# Patient Record
Sex: FEMALE | Race: WHITE
[De-identification: names, ages, dates, MRNs, and addresses within clinical notes are randomized per-mention and may not be internally consistent; named-entity substitution may affect disease eponyms.]

---

## 2017-04-01 ENCOUNTER — HOSPITAL ENCOUNTER (OUTPATIENT)
Dept: HOSPITAL 10 - FTE | Age: 22
LOS: 1 days | Discharge: HOME | End: 2017-04-02
Attending: OBSTETRICS & GYNECOLOGY
Payer: COMMERCIAL

## 2017-04-01 VITALS — SYSTOLIC BLOOD PRESSURE: 140 MMHG | DIASTOLIC BLOOD PRESSURE: 79 MMHG | HEART RATE: 104 BPM | RESPIRATION RATE: 12 BRPM

## 2017-04-01 VITALS — DIASTOLIC BLOOD PRESSURE: 96 MMHG | RESPIRATION RATE: 20 BRPM | SYSTOLIC BLOOD PRESSURE: 147 MMHG | HEART RATE: 104 BPM

## 2017-04-01 VITALS — RESPIRATION RATE: 18 BRPM | HEART RATE: 96 BPM | SYSTOLIC BLOOD PRESSURE: 150 MMHG | DIASTOLIC BLOOD PRESSURE: 88 MMHG

## 2017-04-01 VITALS — HEART RATE: 102 BPM | RESPIRATION RATE: 18 BRPM | DIASTOLIC BLOOD PRESSURE: 74 MMHG | SYSTOLIC BLOOD PRESSURE: 155 MMHG

## 2017-04-01 VITALS — HEART RATE: 104 BPM | RESPIRATION RATE: 17 BRPM | SYSTOLIC BLOOD PRESSURE: 147 MMHG | DIASTOLIC BLOOD PRESSURE: 94 MMHG

## 2017-04-01 VITALS — SYSTOLIC BLOOD PRESSURE: 160 MMHG | DIASTOLIC BLOOD PRESSURE: 70 MMHG | HEART RATE: 110 BPM | RESPIRATION RATE: 16 BRPM

## 2017-04-01 VITALS — SYSTOLIC BLOOD PRESSURE: 141 MMHG | DIASTOLIC BLOOD PRESSURE: 88 MMHG | RESPIRATION RATE: 24 BRPM | HEART RATE: 102 BPM

## 2017-04-01 VITALS — DIASTOLIC BLOOD PRESSURE: 77 MMHG | HEART RATE: 118 BPM | SYSTOLIC BLOOD PRESSURE: 125 MMHG | RESPIRATION RATE: 17 BRPM

## 2017-04-01 VITALS — HEART RATE: 102 BPM | DIASTOLIC BLOOD PRESSURE: 86 MMHG | RESPIRATION RATE: 23 BRPM | SYSTOLIC BLOOD PRESSURE: 132 MMHG

## 2017-04-01 VITALS — DIASTOLIC BLOOD PRESSURE: 92 MMHG | RESPIRATION RATE: 20 BRPM | SYSTOLIC BLOOD PRESSURE: 130 MMHG | HEART RATE: 106 BPM

## 2017-04-01 VITALS — SYSTOLIC BLOOD PRESSURE: 140 MMHG | HEART RATE: 106 BPM | RESPIRATION RATE: 22 BRPM | DIASTOLIC BLOOD PRESSURE: 94 MMHG

## 2017-04-01 VITALS — SYSTOLIC BLOOD PRESSURE: 125 MMHG | DIASTOLIC BLOOD PRESSURE: 73 MMHG | HEART RATE: 119 BPM | RESPIRATION RATE: 17 BRPM

## 2017-04-01 VITALS — DIASTOLIC BLOOD PRESSURE: 89 MMHG | RESPIRATION RATE: 26 BRPM | SYSTOLIC BLOOD PRESSURE: 140 MMHG | HEART RATE: 102 BPM

## 2017-04-01 VITALS
HEIGHT: 60 IN | WEIGHT: 264.55 LBS | BODY MASS INDEX: 51.94 KG/M2 | WEIGHT: 264.55 LBS | HEIGHT: 60 IN | BODY MASS INDEX: 51.94 KG/M2

## 2017-04-01 VITALS — SYSTOLIC BLOOD PRESSURE: 166 MMHG | RESPIRATION RATE: 23 BRPM | DIASTOLIC BLOOD PRESSURE: 92 MMHG | HEART RATE: 114 BPM

## 2017-04-01 VITALS — RESPIRATION RATE: 17 BRPM | SYSTOLIC BLOOD PRESSURE: 126 MMHG | DIASTOLIC BLOOD PRESSURE: 73 MMHG | HEART RATE: 120 BPM

## 2017-04-01 VITALS — DIASTOLIC BLOOD PRESSURE: 75 MMHG | SYSTOLIC BLOOD PRESSURE: 122 MMHG | RESPIRATION RATE: 20 BRPM

## 2017-04-01 VITALS — SYSTOLIC BLOOD PRESSURE: 159 MMHG | DIASTOLIC BLOOD PRESSURE: 96 MMHG | HEART RATE: 116 BPM | RESPIRATION RATE: 18 BRPM

## 2017-04-01 VITALS — HEART RATE: 120 BPM | RESPIRATION RATE: 17 BRPM | SYSTOLIC BLOOD PRESSURE: 126 MMHG | DIASTOLIC BLOOD PRESSURE: 70 MMHG

## 2017-04-01 VITALS — HEART RATE: 104 BPM | RESPIRATION RATE: 19 BRPM | DIASTOLIC BLOOD PRESSURE: 75 MMHG | SYSTOLIC BLOOD PRESSURE: 153 MMHG

## 2017-04-01 VITALS — SYSTOLIC BLOOD PRESSURE: 125 MMHG | DIASTOLIC BLOOD PRESSURE: 72 MMHG | RESPIRATION RATE: 17 BRPM | HEART RATE: 119 BPM

## 2017-04-01 VITALS — DIASTOLIC BLOOD PRESSURE: 88 MMHG | RESPIRATION RATE: 19 BRPM | SYSTOLIC BLOOD PRESSURE: 159 MMHG | HEART RATE: 112 BPM

## 2017-04-01 VITALS — RESPIRATION RATE: 17 BRPM | SYSTOLIC BLOOD PRESSURE: 126 MMHG | DIASTOLIC BLOOD PRESSURE: 76 MMHG | HEART RATE: 118 BPM

## 2017-04-01 VITALS — RESPIRATION RATE: 17 BRPM | DIASTOLIC BLOOD PRESSURE: 86 MMHG | HEART RATE: 115 BPM | SYSTOLIC BLOOD PRESSURE: 156 MMHG

## 2017-04-01 VITALS — TEMPERATURE: 98.5 F

## 2017-04-01 VITALS — RESPIRATION RATE: 20 BRPM | SYSTOLIC BLOOD PRESSURE: 125 MMHG | DIASTOLIC BLOOD PRESSURE: 73 MMHG

## 2017-04-01 DIAGNOSIS — E66.01: ICD-10-CM

## 2017-04-01 DIAGNOSIS — D27.0: Primary | ICD-10-CM

## 2017-04-01 LAB
ADD SCAN DIFF: NO
ADD SCAN DIFF: NO
ADD UMIC: YES
ALBUMIN SERPL-MCNC: 3.9 G/DL (ref 3.3–4.9)
ALBUMIN SERPL-MCNC: 4.7 G/DL (ref 3.3–4.9)
ALBUMIN/GLOB SERPL: 0.97 {RATIO}
ALBUMIN/GLOB SERPL: 1.02 {RATIO}
ALP SERPL-CCNC: 55 IU/L (ref 42–121)
ALP SERPL-CCNC: 71 IU/L (ref 42–121)
ALT SERPL-CCNC: 49 IU/L (ref 13–69)
ALT SERPL-CCNC: 51 IU/L (ref 13–69)
AMYLASE SERPL-CCNC: 55 U/L (ref 11–123)
ANION GAP SERPL CALC-SCNC: 16 MMOL/L (ref 8–16)
ANION GAP SERPL CALC-SCNC: 19 MMOL/L (ref 8–16)
APTT BLD: 32 SEC (ref 25–35)
AST SERPL-CCNC: 28 IU/L (ref 15–46)
AST SERPL-CCNC: 29 IU/L (ref 15–46)
BACTERIA #/AREA URNS HPF: (no result) /[HPF]
BASOPHILS # BLD AUTO: 0 10^3/UL (ref 0–0.1)
BASOPHILS # BLD AUTO: 0 10^3/UL (ref 0–0.1)
BASOPHILS NFR BLD: 0.1 % (ref 0–2)
BASOPHILS NFR BLD: 0.2 % (ref 0–2)
BILIRUB DIRECT SERPL-MCNC: 0 MG/DL (ref 0–0.2)
BILIRUB DIRECT SERPL-MCNC: 0 MG/DL (ref 0–0.2)
BILIRUB SERPL-MCNC: 0.3 MG/DL (ref 0.2–1.3)
BILIRUB SERPL-MCNC: 0.6 MG/DL (ref 0.2–1.3)
BUN SERPL-MCNC: 13 MG/DL (ref 7–20)
BUN SERPL-MCNC: 8 MG/DL (ref 7–20)
CALCIUM SERPL-MCNC: 8.5 MG/DL (ref 8.4–10.2)
CALCIUM SERPL-MCNC: 9.2 MG/DL (ref 8.4–10.2)
CHLORIDE SERPL-SCNC: 101 MMOL/L (ref 97–110)
CHLORIDE SERPL-SCNC: 102 MMOL/L (ref 97–110)
CO2 SERPL-SCNC: 25 MMOL/L (ref 21–31)
CO2 SERPL-SCNC: 25 MMOL/L (ref 21–31)
COLOR UR: (no result)
CREAT SERPL-MCNC: 0.53 MG/DL (ref 0.44–1)
CREAT SERPL-MCNC: 0.64 MG/DL (ref 0.44–1)
EOSINOPHIL # BLD: 0 10^3/UL (ref 0–0.5)
EOSINOPHIL # BLD: 0 10^3/UL (ref 0–0.5)
EOSINOPHIL NFR BLD: 0 % (ref 0–7)
EOSINOPHIL NFR BLD: 0.1 % (ref 0–7)
ERYTHROCYTE [DISTWIDTH] IN BLOOD BY AUTOMATED COUNT: 12.3 % (ref 11.5–14.5)
ERYTHROCYTE [DISTWIDTH] IN BLOOD BY AUTOMATED COUNT: 12.5 % (ref 11.5–14.5)
GLOBULIN SER-MCNC: 4 G/DL (ref 1.3–3.2)
GLOBULIN SER-MCNC: 4.6 G/DL (ref 1.3–3.2)
GLUCOSE SERPL-MCNC: 138 MG/DL (ref 70–220)
GLUCOSE SERPL-MCNC: 139 MG/DL (ref 70–220)
GLUCOSE UR STRIP-MCNC: NEGATIVE %
HCT VFR BLD CALC: 36.2 % (ref 37–47)
HCT VFR BLD CALC: 42.3 % (ref 37–47)
HGB BLD-MCNC: 12.3 G/DL (ref 12–16)
HGB BLD-MCNC: 14.1 G/DL (ref 12–16)
INR PPP: 0.95
KETONES UR STRIP.AUTO-MCNC: NEGATIVE MG/DL
LYMPHOCYTES # BLD AUTO: 1 10^3/UL (ref 0.8–2.9)
LYMPHOCYTES # BLD AUTO: 2.4 10^3/UL (ref 0.8–2.9)
LYMPHOCYTES NFR BLD AUTO: 16 % (ref 18–55)
LYMPHOCYTES NFR BLD AUTO: 6.2 % (ref 18–55)
MCH RBC QN AUTO: 29.1 PG (ref 29–33)
MCH RBC QN AUTO: 29.6 PG (ref 29–33)
MCHC RBC AUTO-ENTMCNC: 33.3 G/DL (ref 32–37)
MCHC RBC AUTO-ENTMCNC: 34 G/DL (ref 32–37)
MCV RBC AUTO: 87.2 FL (ref 72–104)
MCV RBC AUTO: 87.4 FL (ref 72–104)
MONOCYTES # BLD: 0.7 10^3/UL (ref 0.3–0.9)
MONOCYTES # BLD: 0.7 10^3/UL (ref 0.3–0.9)
MONOCYTES NFR BLD: 3.9 % (ref 0–13)
MONOCYTES NFR BLD: 4.8 % (ref 0–13)
NEUTROPHILS # BLD: 11.9 10^3/UL (ref 1.6–7.5)
NEUTROPHILS # BLD: 15.1 10^3/UL (ref 1.6–7.5)
NEUTROPHILS NFR BLD AUTO: 78.4 % (ref 30–74)
NEUTROPHILS NFR BLD AUTO: 89.4 % (ref 30–74)
NITRITE UR QL STRIP.AUTO: NEGATIVE
NRBC # BLD MANUAL: 0 10^3/UL (ref 0–0)
NRBC # BLD MANUAL: 0 10^3/UL (ref 0–0)
NRBC BLD QL: 0 /100WBC (ref 0–0)
NRBC BLD QL: 0 /100WBC (ref 0–0)
PLATELET # BLD: 291 10^3/UL (ref 140–415)
PLATELET # BLD: 396 10^3/UL (ref 140–415)
PMV BLD AUTO: 8.9 FL (ref 7.4–10.4)
PMV BLD AUTO: 9.1 FL (ref 7.4–10.4)
POTASSIUM SERPL-SCNC: 3.7 MMOL/L (ref 3.5–5.1)
POTASSIUM SERPL-SCNC: 3.7 MMOL/L (ref 3.5–5.1)
PROT SERPL-MCNC: 7.9 G/DL (ref 6.1–8.1)
PROT SERPL-MCNC: 9.3 G/DL (ref 6.1–8.1)
PROTHROMBIN TIME: 12.7 SEC (ref 12.2–14.2)
PT RATIO: 1
RBC # BLD AUTO: 4.15 10^6/UL (ref 4.2–5.4)
RBC # BLD AUTO: 4.84 10^6/UL (ref 4.2–5.4)
RBC # UR AUTO: (no result) /UL
RBC #/AREA URNS HPF: (no result) /HPF
SODIUM SERPL-SCNC: 138 MMOL/L (ref 135–144)
SODIUM SERPL-SCNC: 142 MMOL/L (ref 135–144)
URINE BILIRUBIN (DIP): NEGATIVE
URINE TOTAL PROTEIN (DIP): NEGATIVE
UROBILINOGEN UR STRIP-ACNC: (no result) (ref 0.1–1)
WBC # BLD AUTO: 15.2 10^3/UL (ref 4.8–10.8)
WBC # BLD AUTO: 16.9 10^3/UL (ref 4.8–10.8)
WBC # UR STRIP: (no result) /UL

## 2017-04-01 PROCEDURE — 81003 URINALYSIS AUTO W/O SCOPE: CPT

## 2017-04-01 PROCEDURE — 86901 BLOOD TYPING SEROLOGIC RH(D): CPT

## 2017-04-01 PROCEDURE — 81001 URINALYSIS AUTO W/SCOPE: CPT

## 2017-04-01 PROCEDURE — 74177 CT ABD & PELVIS W/CONTRAST: CPT

## 2017-04-01 PROCEDURE — 88104 CYTOPATH FL NONGYN SMEARS: CPT

## 2017-04-01 PROCEDURE — 86900 BLOOD TYPING SEROLOGIC ABO: CPT

## 2017-04-01 PROCEDURE — 85730 THROMBOPLASTIN TIME PARTIAL: CPT

## 2017-04-01 PROCEDURE — 80306 DRUG TEST PRSMV INSTRMNT: CPT

## 2017-04-01 PROCEDURE — 96376 TX/PRO/DX INJ SAME DRUG ADON: CPT

## 2017-04-01 PROCEDURE — 76856 US EXAM PELVIC COMPLETE: CPT

## 2017-04-01 PROCEDURE — 49322 LAPAROSCOPY ASPIRATION: CPT

## 2017-04-01 PROCEDURE — 85025 COMPLETE CBC W/AUTO DIFF WBC: CPT

## 2017-04-01 PROCEDURE — 96374 THER/PROPH/DIAG INJ IV PUSH: CPT

## 2017-04-01 PROCEDURE — 96375 TX/PRO/DX INJ NEW DRUG ADDON: CPT

## 2017-04-01 PROCEDURE — 85610 PROTHROMBIN TIME: CPT

## 2017-04-01 PROCEDURE — 82150 ASSAY OF AMYLASE: CPT

## 2017-04-01 PROCEDURE — 83690 ASSAY OF LIPASE: CPT

## 2017-04-01 PROCEDURE — 86850 RBC ANTIBODY SCREEN: CPT

## 2017-04-01 PROCEDURE — 87086 URINE CULTURE/COLONY COUNT: CPT

## 2017-04-01 PROCEDURE — 86920 COMPATIBILITY TEST SPIN: CPT

## 2017-04-01 PROCEDURE — 80053 COMPREHEN METABOLIC PANEL: CPT

## 2017-04-01 PROCEDURE — 84703 CHORIONIC GONADOTROPIN ASSAY: CPT

## 2017-04-01 PROCEDURE — 88305 TISSUE EXAM BY PATHOLOGIST: CPT

## 2017-04-01 RX ADMIN — CEFAZOLIN SCH MLS/HR: 1 INJECTION, POWDER, FOR SOLUTION INTRAMUSCULAR; INTRAVENOUS at 22:00

## 2017-04-01 RX ADMIN — CEFAZOLIN SCH MLS/HR: 1 INJECTION, POWDER, FOR SOLUTION INTRAMUSCULAR; INTRAVENOUS at 20:45

## 2017-04-01 NOTE — HPN
Date/Time of Note


Date/Time of Note


DATE: 4/1/17 


TIME: 10:42





Interval H&P Admission Note





 


 Laboratory Tests








Test


  4/1/17


07:15


 


Blood Urea Nitrogen 13mg/dl 


 


Carbon Dioxide Level 25mmol/L 


 


Chloride Level 102mmol/L 


 


Creatinine 0.64mg/dl 


 


Glucose Level 139mg/dl 


 


Hematocrit 42.3% 


 


Hemoglobin 14.1g/dl 


 


Platelet Count 91496^3/UL 


 


Potassium Level 3.7mmol/L 


 


Sodium Level 142mmol/L 


 


White Blood Count 15.210^3/ul 





Patient is a 21-year-old virginal who presents with acute onset of lower pelvic 

pain with a pain level of 9 out of 10 which started early this morning


Patient reports some nausea vomiting, no diarrhea, no fever


CT scan and pelvic ultrasound with Dopplers suggesting ovarian torsion


Patient is hemodynamically stable





PE


VSS


Abd soft, positive pain in the right lower quadrant


positive rebound tenderness on the right lower quadrant





A/P


Ovarian Torsion unclear if on the right or left side


Plan to proceed with Laparoscopic un-torsion of the ovary, possible unilateral/

bilateral salpingo-oophorectomy, possible laparotomy


Patient was counseled regarding benefits and all risks including but not 

limited to infection, bleeding, possible trauma to other organs including 

bladder or bowel


Patient understand and agrees to proceed with planned surgery











MOY JETT Apr 1, 2017 11:01

## 2017-04-01 NOTE — RADRPT
PROCEDURE:   US Pelvis. 

 

CLINICAL INDICATION:   21-year-old female with pelvic pain. 

 

TECHNIQUE:   Multiple sonographic images of the pelvis were obtained utilizing a transabdominal and 
endovaginal technique.   The images were reviewed on a PACS workstation. 

 

COMPARISON:   None. 

 

FINDINGS:

The uterus is visualized and measures 9.4 cm sagittal by 2.9 cm AP by 3.4 cm transverse transabdomin
al imaging. The endometrial echo complex is normal and measures 7.9 mm. There is no evidence for dilma
e fluid. The right ovary has a normal echotexture and measures 7.5 x 4.5 by 6.2 cm on endovaginal im
aging .  The left ovary has a normal echotexture and measures 3 x 2 by 2.5 cm transabdominal imaging
. 13.7 by 8.8 x 13.2 cm left adnexal cyst is identified.

 

IMPRESSION:

 

1.  13 .7 x 13.1 x 8.7 cm cystic left adnexal mass.

2.  Enlarged heterogeneous right ovary without normal blood flow on Doppler imaging.  Findings consi
stent with right ovarian torsion.

3.  Normal left ovary.

4.  Findings were phoned immediately to Dr. Ashley Egan.

 

RPTAT:AAJJ

_____________________________________________ 

Physician Marina           Date    Time 

Electronically viewed and signed by Physician Marina on 04/01/2017 10:03 

 

D:  04/01/2017 10:03  T:  04/01/2017 10:03

JOSEFINA/

## 2017-04-01 NOTE — ERD
ER Documentation


Chief Complaint


Date/Time


DATE: 17 


TIME: 07:24


Chief Complaint


RLQ PAIN SINCE YESTERSDAY





HPI


This is a 21-year-old female that presents to emergency department complaining 

of a sudden onset of right flank pain that began to radiate to the right lower 

quadrant.  The patient stated the pain awoke her from her sleep roughly 1 hour 

prior to arrival.  She has had multiple episodes of nonbloody nonbilious emesis 

since the onset of the pain.  Patient did indicate she had been drinking 

alcohol with her friends yesterday evening but denied any illicit drug use.  

She indicates she has had similar episodes of this pain over the past year.  

She denies any abnormal urethral discharge and indicates she is not sexually 

active.  She denies any menorrhagia or metrorrhagia.  She had no fevers or 

shaking or chills.  She does indicate that she has been experiencing painful 

urination over the past several days with no frequency urgency.  She denies any 

recent remote blunt abdominal trauma.  She has had no recent travel or 

hospitalizations.  She did not take any analgesic medication for the pain.





ROS


All systems reviewed and are negative except as per history of present illness.





Medications


Home Meds


Active Scripts


Hydrocodone Bit-Acetaminophen* (Norco*) 5-325 Mg Tab, 1 TAB PO Q6 Y for PAIN, #

20 TAB


   Prov:AMANDA CHAVEZ NP         16


Reported Medications


[None] Unknown Strength  No Conflict Check


   16


[Denies Meds]   No Conflict Check


   9/29/10





Allergies


Allergies:  


Coded Allergies:  


     No Known Drug Allergy (Verified  Allergy, Mild, 9/29/10)





PMhx/Soc


History of Surgery:  No


Anesthesia Reaction:  No


Hx Neurological Disorder:  No


Hx Respiratory Disorders:  No


Hx Cardiac Disorders:  No


Hx Psychiatric Problems:  No


Hx Miscellaneous Medical Probl:  No


Hx Alcohol Use:  No


Hx Substance Use:  No


Hx Tobacco Use:  No





Physical Exam


Vitals





Vital Signs








  Date Time  Temp Pulse Resp B/P Pulse Ox O2 Delivery O2 Flow Rate FiO2


 


17 09:53 98.5 108 18 135/79 96 Room Air  


 


17 07:07 98.0 121 18 168/96 99   








Physical Exam


Constitutional:Well-developed. Well-nourished.  Patient actively vomiting and 

appeared to be in a significant amount of discomfort secondary to pain.


HEENT:Normocephalic. Atraumatic.Pupils were equal round reactive to light. Dry 

mucous membranes.No tonsillar exudates.


Neck: No nuchal rigidity. No lymphadenopathy. No posterior cervical spine 

tenderness or step-offs.


Respiratory: Not using accessory muscles of respiration.Lungs were clear to 

auscultation bilaterally. No rhonchi. No rales. No wheezing. 


Cardiovascular: Regular rate regular rhythm.No murmurs. No rubs were 

appreciated.S1, S2 normal. Distal pulses are palpable 2+ bilaterally.


GI: Abdomen was obese so exam is limited due to body habitus.  Right CVA 

tenderness and tenderness in the right lower quadrant not specifically over 

McBurney's point.  Psoas sign negative.  Obturator sign negative.  Non 

Distended. No pulsatile abdominal masses or bruits. No rebound. No guarding. 

Bowel sounds were present and normal. 


: Pelvic exam not performed the patient states she is not currently sexually 

active and has never engaged in sexual intercourse


Muscle skeletal: Full range of motion of both the upper and lower extremities 

bilaterally.Normal muscle tone.No assymetrical calf tenderness or swelling. 


Skin: No petechia, no purpura. No lesions on the palms or the soles of the 

feet. No maculopapular rash.


NEURO: Patient was alert, awake, orientated x3.No facial droop. Gait observed 

and normal with no ataxia.Speech had regular rate and rhythm. No focal 

neurological deficits.


Result Diagram:  


17 0715                                                                    

            17 0715





Results 24 hrs





 Laboratory Tests








Test


  17


07:15


 


White Blood Count 15.210^3/ul 


 


Red Blood Count 4.8410^6/ul 


 


Hemoglobin 14.1g/dl 


 


Hematocrit 42.3% 


 


Mean Corpuscular Volume 87.4fl 


 


Mean Corpuscular Hemoglobin 29.1pg 


 


Mean Corpuscular Hemoglobin


Concent 33.3g/dl 


 


 


Red Cell Distribution Width 12.3% 


 


Platelet Count 31594^3/UL 


 


Mean Platelet Volume 9.1fl 


 


Neutrophils % 78.4% 


 


Lymphocytes % 16.0% 


 


Monocytes % 4.8% 


 


Eosinophils % 0.1% 


 


Basophils % 0.2% 


 


Nucleated Red Blood Cells % 0.0/100WBC 


 


Neutrophils # 11.910^3/ul 


 


Lymphocytes # 2.410^3/ul 


 


Monocytes # 0.710^3/ul 


 


Eosinophils # 0.010^3/ul 


 


Basophils # 0.010^3/ul 


 


Nucleated Red Blood Cells # 0.010^3/ul 


 


Prothrombin Time 12.7Sec 


 


Prothrombin Time Ratio 1.0 


 


INR International Normalized


Ratio 0.95 


 


 


Activated Partial


Thromboplast Time 32.0Sec 


 


 


Urine Color LT. YELLOW 


 


Urine Clarity CLEAR 


 


Urine pH 5.5 


 


Urine Specific Gravity 1.020 


 


Urine Ketones NEGATIVE 


 


Urine Nitrite NEGATIVE 


 


Urine Bilirubin NEGATIVE 


 


Urine Urobilinogen 0.2  E.U./dL 


 


Urine Leukocyte Esterase 1+ 


 


Urine Microscopic RBC 0-2/HPF 


 


Urine Microscopic WBC 2-5/HPF 


 


Urine Epithelial Cells FEW 


 


Urine Bacteria FEW 


 


Urine Hemoglobin TRACE 


 


Urine Glucose NEGATIVE% 


 


Urine Total Protein NEGATIVE 


 


Sodium Level 142mmol/L 


 


Potassium Level 3.7mmol/L 


 


Chloride Level 102mmol/L 


 


Carbon Dioxide Level 25mmol/L 


 


Anion Gap 19 


 


Blood Urea Nitrogen 13mg/dl 


 


Creatinine 0.64mg/dl 


 


Glucose Level 139mg/dl 


 


Calcium Level 9.2mg/dl 


 


Total Bilirubin 0.3mg/dl 


 


Direct Bilirubin 0.00mg/dl 


 


Indirect Bilirubin 0.3mg/dl 


 


Aspartate Amino Transf


(AST/SGOT) 29IU/L 


 


 


Alanine Aminotransferase


(ALT/SGPT) 51IU/L 


 


 


Alkaline Phosphatase 71IU/L 


 


Total Protein 9.3g/dl 


 


Albumin 4.7g/dl 


 


Globulin 4.60g/dl 


 


Albumin/Globulin Ratio 1.02 


 


Amylase Level 55U/L 


 


Lipase 36U/L 


 


Ethyl Alcohol Level < 10.0mg/dl 








 Current Medications








 Medications


  (Trade)  Dose


 Ordered  Sig/Kailee


 Route


 PRN Reason  Start Time


 Stop Time Status Last Admin


Dose Admin


 


 Sodium Chloride


  (NS)  1,000 ml @ 


 1,000 mls/hr  Q1H STAT


 IV


   17 07:13


 17 08:12 DC 17 07:25


 


 


 Morphine Sulfate


  (morphine)  2 mg  ONCE  STAT


 IV


   17 07:13


 17 07:14 Cancel  


 


 


 Ondansetron HCl


  (Zofran Inj)  4 mg  ONCE  STAT


 IV


   17 07:13


 17 07:15 DC 17 07:25


 


 


 Morphine Sulfate


  (morphine)  4 mg  ONCE  STAT


 IV


   17 07:20


 17 07:23 DC 17 07:28


 


 


 Metoclopramide HCl


  (Reglan)  10 mg  ONCE  STAT


 IV


   17 07:20


 17 07:23 DC 17 07:28


 


 


 Famotidine


  (Pepcid Iv)  20 mg  ONCE  STAT


 IV


   17 07:20


 17 07:23 DC 17 07:28


 


 


 Hydromorphone HCl


  (Dilaudid)  1 mg  ONCE  STAT


 IV


   17 07:53


 17 07:54 DC 17 07:57


 


 


 IV Flush 10 ml  10 ml  STK-MED ONCE


 .ROUTE


   17 08:10


 17 08:11 DC 17 08:49


 


 


 Sodium Chloride


  (NS)  100 ml @ ud  STK-MED ONCE


 .ROUTE


   17 08:10


 17 08:11 DC 17 08:49


 


 


 Iohexol 150 ml  150 ml  STK-MED ONCE


 .ROUTE


   17 08:10


 17 08:11 DC 17 08:49


 


 


 Piperacillin Sod/


 Tazobactam Sod


  (Zosyn 3.375gm/


 100 ml (Pmx))  100 ml @ 


 200 mls/hr  ONCE  ONCE


 IVPB


   17 09:30


 17 09:59 DC 17 09:48


 


 


 Ondansetron HCl


  (Zofran Inj)  4 mg  BRIDGE ORDER PRN


 IV


 NAUSEA AND/OR VOMITING  17 10:00


 17 09:59   


 


 


 Hydromorphone HCl


  (Dilaudid)  1 mg  ONCE  STAT


 IV


   17 10:03


 17 10:09 DC 17 10:11


 


 


 Ondansetron HCl


  (Zofran Inj)  4 mg  ONCE  STAT


 IV


   17 10:03


 17 10:09 DC 17 10:11


 











Procedures/MDM


This patient presented to the emergency department with abdominal pain and was 

seen and evaluated by myself. My differential diagnosis included but was not 

limited to abdominal aortic aneurysm, appendicitis, pancreatitis, perforated 

peptic ulcer, perforated viscus, Boerhaaves syndrome or visceral pain such as 

diverticulitis, DKA, esophagitis, hepatitis or bowel obstruction.





The patient was placed on a cardiac monitor, continuous pulse oximetry, and IV 

access was established by nursing staff.  The patient was actively vomiting and 

therefore was immediately given IV Zofran and Reglan and Pepcid.  For analgesic 

control the patient received intravenous morphine no improvement of her pain.  

Therefore she received intravenous Dilaudid





I obtained a CT scan of the abdomen given the severity of the patient's pain.  

CT scan of the abdomen or and reviewed by myself the radiologist indicated the 

followin.  There is a large left adnexal cyst measuring 7.9 x 12.4 x 13.1 cm which 

lies adjacent to or may arise from an elliptical solid mass potentially 

representing the right ovary measuring 8.6 x 4.6 x 4.7 cm. A pelvic sonogram is 

recommended for further characterization. An ovarian torsion should be 

excluded. No free fluid is present in the pelvis.


2.  Hepatomegaly with fatty infiltration of the liver.


3.  Normal vermiform appendix.  No evidence of diverticulosis or diverticulitis.


4.  Obesity.





I immediately obtained an OBGYN consult and spoke with Dr. Sen. Given the 

presentation we prepared for the patient to go to the OR and during this 

process also obtained an ultrasound with color flow study by the abdomen as the 

patient is not currently sexually active therefore transvaginal ultrasound was 

not obtained.  This was consistent with ovarian torsion.  





I had already spoken with the OB/GYN on-call, Dr. Sen, and informed her of 

the severity of the patient's condition prior to the ultrasound being obtained.

  Dr. Sen will be taking the patient to the OR is currently informed that 

the patient will be going for immediate surgical intervention.  I spoke with 

the ultrasound tech who did indicate there was no color flow to the right ovary 

which was suggestive of ovarian torsion.   The patient had already been given 

IV Zosyn.  She received further analgesic control of Dilaudid.  Her pain at the 

time of going to the OR at 10:02 AM was 6 out of 10 in intensity.  The nursing 

supervisor was also informed and kindly helped to immediately set up for the 

patient to go to the OR stat due to the severity of her condition.





Critical Care:


Time: 60 minutes


Treatments/Evaluations: Close monitoring and treatment of unstable vital signs, 

cardiorespiratory, and neurologic status, while maintaining tight balance of 

fluid, respiratory, and cardiac interventions.  Time does not include 

performing any of the above billable procedures.





Departure


Diagnosis:  


 Primary Impression:  


 Ovarian torsion


Condition:  Serious











DANITA HUTTON 2017 07:26

## 2017-04-02 VITALS — DIASTOLIC BLOOD PRESSURE: 72 MMHG | SYSTOLIC BLOOD PRESSURE: 119 MMHG | RESPIRATION RATE: 18 BRPM

## 2017-04-02 VITALS — RESPIRATION RATE: 18 BRPM | DIASTOLIC BLOOD PRESSURE: 62 MMHG | SYSTOLIC BLOOD PRESSURE: 115 MMHG

## 2017-04-02 LAB
ADD SCAN DIFF: NO
BASOPHILS # BLD AUTO: 0 10^3/UL (ref 0–0.1)
BASOPHILS NFR BLD: 0.1 % (ref 0–2)
EOSINOPHIL # BLD: 0 10^3/UL (ref 0–0.5)
EOSINOPHIL NFR BLD: 0 % (ref 0–7)
ERYTHROCYTE [DISTWIDTH] IN BLOOD BY AUTOMATED COUNT: 12.8 % (ref 11.5–14.5)
HCT VFR BLD CALC: 35.4 % (ref 37–47)
HGB BLD-MCNC: 11.6 G/DL (ref 12–16)
LYMPHOCYTES # BLD AUTO: 1.9 10^3/UL (ref 0.8–2.9)
LYMPHOCYTES NFR BLD AUTO: 17.8 % (ref 18–55)
MCH RBC QN AUTO: 29.3 PG (ref 29–33)
MCHC RBC AUTO-ENTMCNC: 32.8 G/DL (ref 32–37)
MCV RBC AUTO: 89.4 FL (ref 72–104)
MONOCYTES # BLD: 0.8 10^3/UL (ref 0.3–0.9)
MONOCYTES NFR BLD: 7 % (ref 0–13)
NEUTROPHILS # BLD: 8 10^3/UL (ref 1.6–7.5)
NEUTROPHILS NFR BLD AUTO: 74.8 % (ref 30–74)
NRBC # BLD MANUAL: 0 10^3/UL (ref 0–0)
NRBC BLD QL: 0 /100WBC (ref 0–0)
PLATELET # BLD: 296 10^3/UL (ref 140–415)
PMV BLD AUTO: 9.3 FL (ref 7.4–10.4)
RBC # BLD AUTO: 3.96 10^6/UL (ref 4.2–5.4)
WBC # BLD AUTO: 10.7 10^3/UL (ref 4.8–10.8)

## 2017-04-02 RX ADMIN — CEFAZOLIN SCH MLS/HR: 1 INJECTION, POWDER, FOR SOLUTION INTRAMUSCULAR; INTRAVENOUS at 05:07

## 2017-04-02 NOTE — DS
Date/Time of Note


Date/Time of Note


DATE: 4/2/17 


TIME: 10:21





Discharge Summary


Admission/Discharge Info


Admit Date/Time


4/1/17


Discharge Date/Time


4/2/17


Final Diagnosis


Ovarian torsion s/p L/S R ovarian cystectomy and de-torsion


Patient Condition:  Good


Procedures


Diagnostic Laparoscopy with R ovarian cystectomy and de-torsion


Hospital Course


20yo G0 admitted and taken to the Operating Room for lower abdominal pain and 

imaging suggestive of large ovarian cysts and torsion. In OR, R ovarian torsion 

noted along with large R ovarian cyst. Cystectomy was performed. Hemorrhagic 

cyst noted on R ovary as well. Pt admitted due to nausea and vomiting on POD#0, 

which resolved by POD#1. At the time of discharge, the patient was tolerating a 

regular diet without nausea and vomiting, ambulating and her pain was well 

controlled. Exam notable for well-appearing young woman in no distress, 

cardiovascular and pulmonary exams were unremarkable. Positive bowel sounds 

noted on abdominal exam. Surgical dressings removed and incisions c/d/i with 

Dermabond. Post-op Hgb 11.6 following 25ml EBL at time of surgery.


Home Meds


Active Scripts


Docusate Sodium* (Colace*) 100 Mg Capsule, 100 MG PO BID for CONSTIPATION for 

30 Days, #60 CAP 2 Refills


   Prov:JUAN CARLOS EPPERSON MD         4/2/17


Ibuprofen* (Ibuprofen*) 600 Mg Tablet, 600 MG PO Q6H Y for PAIN for 10 Days, #

40 TAB 1 Refill


   Do not take medication on an empty stomach. Take with food or milk.


   Prov:JUAN CARLOS EPPERSON MD         4/2/17


Discontinued Reported Medications


[None] Unknown Strength  No Conflict Check


   4/25/16


[Denies Meds]   No Conflict Check


   9/29/10


Discontinued Scripts


Hydrocodone Bit-Acetaminophen* (Norco*) 5-325 Mg Tab, 1 TAB PO Q6 Y for PAIN, #

20 TAB


   Prov:AMANDA CHAVEZ NP         4/25/16


Follow-up Plan


F/up with OB/GYN within 2 weeks. Pt advised to avoid heavy lifting for 4 weeks, 

in addition to other precautions on discharge instruction sheet.


Pending Labs





Laboratory Tests








Test


  4/1/17


11:00 4/1/17


19:10 4/2/17


04:28


 


Serum HCG, Qualitative


  NEGATIVE


(NEGATIVE) 


  


 


 


White Blood Count


  


  16.910^3/ul


(4.8-10.8) 10.710^3/ul


(4.8-10.8)


 


Red Blood Count


  


  4.1510^6/ul


(4.20-5.40) 3.9610^6/ul


(4.20-5.40)


 


Hemoglobin


  


  12.3g/dl


(12.0-16.0) 11.6g/dl


(12.0-16.0)


 


Hematocrit


  


  36.2%


(37.0-47.0) 35.4%


(37.0-47.0)


 


Mean Corpuscular Volume


  


  87.2fl


(72.0-104.0) 89.4fl


(72.0-104.0)


 


Mean Corpuscular Hemoglobin


  


  29.6pg


(29.0-33.0) 29.3pg


(29.0-33.0)


 


Mean Corpuscular Hemoglobin


Concent 


  34.0g/dl


(32.0-37.0) 32.8g/dl


(32.0-37.0)


 


Red Cell Distribution Width


  


  12.5%


(11.5-14.5) 12.8%


(11.5-14.5)


 


Platelet Count


  


  01428^3/UL


(140-415) 95075^3/UL


(140-415)


 


Mean Platelet Volume


  


  8.9fl


(7.4-10.4) 9.3fl


(7.4-10.4)


 


Neutrophils %


  


  89.4%


(30.0-74.0) 74.8%


(30.0-74.0)


 


Lymphocytes %


  


  6.2%


(18.0-55.0) 17.8%


(18.0-55.0)


 


Monocytes %


  


  3.9%


(0.0-13.0) 7.0%


(0.0-13.0)


 


Eosinophils %  0.0% (0.0-7.0)  0.0% (0.0-7.0) 


 


Basophils %  0.1% (0.0-2.0)  0.1% (0.0-2.0) 


 


Nucleated Red Blood Cells %


  


  0.0/100WBC


(0.0-0.0) 0.0/100WBC


(0.0-0.0)


 


Neutrophils #


  


  15.110^3/ul


(1.6-7.5) 8.010^3/ul


(1.6-7.5)


 


Lymphocytes #


  


  1.010^3/ul


(0.8-2.9) 1.910^3/ul


(0.8-2.9)


 


Monocytes #


  


  0.710^3/ul


(0.3-0.9) 0.810^3/ul


(0.3-0.9)


 


Eosinophils #


  


  0.010^3/ul


(0.0-0.5) 0.010^3/ul


(0.0-0.5)


 


Basophils #


  


  0.010^3/ul


(0.0-0.1) 0.010^3/ul


(0.0-0.1)


 


Nucleated Red Blood Cells #


  


  0.010^3/ul


(0.0-0.0) 0.010^3/ul


(0.0-0.0)


 


Sodium Level


  


  138mmol/L


(135-144) 


 


 


Potassium Level


  


  3.7mmol/L


(3.5-5.1) 


 


 


Chloride Level


  


  101mmol/L


() 


 


 


Carbon Dioxide Level


  


  25mmol/L


(21-31) 


 


 


Anion Gap  16 (8-16)  


 


Blood Urea Nitrogen  8mg/dl (7-20)  


 


Creatinine


  


  0.53mg/dl


(0.44-1.00) 


 


 


Glucose Level


  


  138mg/dl


() 


 


 


Calcium Level


  


  8.5mg/dl


(8.4-10.2) 


 


 


Total Bilirubin


  


  0.6mg/dl


(0.2-1.3) 


 


 


Direct Bilirubin


  


  0.00mg/dl


(0.00-0.20) 


 


 


Indirect Bilirubin


  


  0.6mg/dl


(0-1.1) 


 


 


Aspartate Amino Transf


(AST/SGOT) 


  28IU/L (15-46) 


  


 


 


Alanine Aminotransferase


(ALT/SGPT) 


  49IU/L (13-69) 


  


 


 


Alkaline Phosphatase


  


  55IU/L


() 


 


 


Total Protein


  


  7.9g/dl


(6.1-8.1) 


 


 


Albumin


  


  3.9g/dl


(3.3-4.9) 


 


 


Globulin


  


  4.00g/dl


(1.3-3.2) 


 


 


Albumin/Globulin Ratio  0.97  

















JUAN CARLOS EPPERSON MD Apr 2, 2017 10:23

## 2017-04-02 NOTE — PD.PPDC
OB/GYN Discharge Instruction


Diagnosis


Final Diagnosis:  Ovarian Cyst with Torsion





Condition


Patient Condition:  Good





Diet


Diet:  Resume Regular Diet





Activity/Restrictions








Activity:   Normal Activity














Restrictions:   No Sexual Activity





 Nothing in the Vagina





 No [place]





 No Tampons, douche











Wound/Drain Care Instructions








Wound/Drain Care Instructions:   Wash with soap and water





 Keep clean and dry











Follow-up


Follow-up with Physician:  2, Week/Weeks





Return to clinic for








GYN Instructions:   Fever greater than 101





 Chills





 Worsening abdominal pain





 Excessive Vaginal Bleeding





 More than 2 pads per hour





 Unable to tolerate diet














Surgical Instructions:   Incisional Drainage





 Incisional Redness

















JUAN CARLOS EPPERSON MD Apr 2, 2017 07:53

## 2017-04-03 NOTE — HP
Date/Time of Note


Date/Time of Note


DATE: 4/3/17 


TIME: 14:02





Assessment/Plan


VTE Prophylaxis


VTE Prophylaxis Intervention:  anti-embolic stocking





Lines/Catheters


IV Catheter Type (from Nrs):  Saline Lock





Assessment/Plan


Chief Complaint/Hosp Course


Patient with ovarian torsion and hemodynamically stable


Problems:  


Assessment/Plan


plan for laparoscopic de-torsion of the ovary


possible ovarian cystectomy


possible unilateral or bilateral salpingoophorectomy


possible laparotomy


patient understands the benefits and all the risks including but not limited to 

infection, bleeding, and trauma to other organs including 


bladder and bowel


patient understands her plan of care and agrees to proceed


all her questions were answered





HPI/ROS


Admit Date/Time


Admit Date/Time








Hx of Present Illness


21-year-old virginal patient who presents with acute onset of pelvic pain


pain level of 9 out of 10


patient reports nausea and an episode of vomiting, no fever, no diarrhea





ROS


patient is hemodynamically stable, stable vital signs and afebrile


severe abdominal and pelvic pain, greater in the right lower quadrant, positive 

rebound tendernessl


Constitutional:  chills, diaphoresis, disoriented, fatigue, febrile, improved, 

nausea, no complaints, other, poor po, weight change


Eyes:  No discharge, No no complaints, No other, No pain, No redness, No visual 

change


ENT:  No bleeding, No congestion, No discharge, No dysphagia, No no complaints, 

No other, No pain, No sore throat


Respiratory:  No cough, No no complaints, No other, No pain, No pleuritic pain, 

No shortness of breath, No sputum, No wheezing


Cardiovascular:  chest pain, edema, lightheadedness, no complaints, orthopenea, 

other, palpitations, paroxysmal nocturnal dyspnea


Gastrointestinal:  blood, constipation, decreased appetite, diarrhea, flatus, 

nausea, no complaints, other, pain, passing stool, vomiting


Genitourinary:  No bleeding, No discharge, No dysuria, No flank pain, No 

hematuria, No no complaints, No other


Musculoskeletal:  No back pain, No bone/joint pain, No neck pain, No no 

complaints, No other, No restricted range of motion, No swelling


Skin:  No bruising, No erythema, No laceration, No no complaints, No other, No 

pruritis, No rash, No skin lesions


Neurologic:  No confusion, No dizziness, No focal-weakness, No headache, No no 

complaints, No other, No seizure, No syncope


Endocrine:  No dry skin, No no complaints, No other, No polydypsia, No polyuria

, No temp intolerance, No weight change


Lymphatic:  No adenopathy, No lymphadema, No no complaints, No other, No tender 

nodes


Psychological:  No anxiety, No confusion, No depression, No nl mood/affect, No 

no complaints, No other, No suicidal


Immunologic:  No immunodeficiency, No no complaints, No other, No pruritis, No 

rhinitis, No urticaria





PMH/Family/Social


Past Medical History


Medical History:  no pertinent history





Past Surgical History


Past Surgical Hx:  no surgical history





Family History


Significant Family History:  no pertinent family hx





Social History


Alcohol Use:  none


Smoking Status:  Never smoker


Drug Use:  none





Exam/Review of Systems


Vital Signs


Vitals





 Vital Signs








  Date Time  Temp Pulse Resp B/P Pulse Ox O2 Delivery O2 Flow Rate FiO2


 


4/2/17 08:29  100 18 119/72 100   


 


4/2/17 00:00 97.6       


 


4/1/17 18:30      Nasal Cannula 4.0 














 Intake and Output   


 


 4/2/17 4/2/17 4/3/17





 15:00 23:00 07:00


 


Intake Total 650 ml  


 


Output Total 520 ml  


 


Balance 130 ml  











Exam


Constitutional:  alert, oriented, well developed


Eyes:  No EOMI, No PERRL, No fundi, disc, No icteric, No nl conjunctiva, No nl 

lids, No nl sclera, No other


ENMT:  No intubated, No mucosa pink and moist, No nl external ears & nose, No 

nl lips & teeth, No nl nasal mucosa & septum, No other, No tympanic membranes


Neck:  No bruits, No jvd, No masses, No non-tender, No nuchal rigidity, No other

, No supple, No thyromegaly


Genitourinary - Female:  No CMT, No CVA tenderness, No nl adnexae, No nl 

external genitalia, No other, No uterus


Extremities:  No calf tenderness, No clubbing, No cyanosis, No edema, No normal 

pulses, No other, No palpable cord, No pitting pedal edema, No tenderness





Labs


Result Diagram:  


4/2/17 0428                                                                    

            4/1/17 1910








Procedures


Procedures


CT scan and pelvic ultrasound with Dopplers suggestive of right ovarian torsion 

with no blood flow to the right ovary











MOY JETT MD Apr 3, 2017 14:04

## 2017-04-03 NOTE — OPR
DATE OF OPERATION:  04/01/2017

 

 

PREOPERATIVE DIAGNOSIS:  Ovarian torsion, large ovarian cyst with no blood flow

 

POSTOPERATIVE DIAGNOSIS:  Ovarian torsion, 7-8 cm right ovarian hemorrhagic cyst
, 

14-15 cm right ovarian follicular cyst.

 

PRIMARY SURGEON: MOY JETT MD



ASSISTANT SURGEON: NADIYA CHÁVEZ MD



ANESTHESIA:  General.

 

ESTIMATED BLOOD LOSS:  25 mL.

 

INTRAVENOUS FLUIDS:  2500 mL.  

 

URINE OUTPUT:  450 mL.  

 

OPERATIONS:  

1.  Laparoscopic detorsion of the right ovary.  

2.  Aspiration of the right ovarian cysts fluid.  

3.  Right ovarian cystectomy.

 

DESCRIPTION OF PROCEDURE:  The patient was taken to the operating room.  After 
adequate amount of anesthesia was given, the patient was placed in dorsal 
lithotomy position.  patient was prepped and draped in a in a normal sterile 
fashion. A Wilson had been already placed to gravity.  At this moment, attention 
was turned to the patient's abdomen where a small incision was made in the 
umbilical region and a 5 mm trocar was introduced through the umbilicus and 
proper placement was confirmed with a 5 mm laparoscope.  Please note that 
insufflation with CO2 gas was proceeded until adequate amount of 
pneumoperitoneum was obtained.  At this moment under direct visualization, 2 
lateral 5 mm laparoscopic ports were placed in both right and left upper 
quadrants.  Then, a survey of patient's pelvis and abdominal anatomy revealed 2 
large right ovarian cysts, one appeared to be a hemorrhagic cyst measuring 
about 7 to 8 cm and the second cyst was a follicular cyst measuring 14 to 15 
cm.  There was a clear evidence of ovarian torsion on the patient's right side 
which appeared purple and blue in color.  The left adnexa was evaluated and 
appeared completely normal.  The patient's uterus appeared normal.  The survey 
of abdominal anatomy revealed normal appendix, normal liver, normal gallbladder
, and normal upper anatomy.  First the peritoneal fluid was collected with 
suction and sent for cytology evaluation.  At this moment, a laparoscopic 
needle attached to a syringe was introduced through the left lateral port and 
the drainage of the cyst fluid was proceeded.  After obtaining the cyst fluid 
in multiple parts, the cyst fluid contents were also sent for cytology 
evaluation.  This drainage of both cysts fluid was done under direct 
visualization without any spillage in the abdominal or pelvis cavity.  After 
the drainage of both ovarian cyst fluid, both cysts appeared decompressed and 
reduced in size which allowed for mobilization of the ovarian cysts.  Using 2 
blunt graspers, the right ovary was de-torsed in a gradual fashion without any 
complication, and at this moment, it was noted that the right adnexa, which had 
initially been found purplish in color, after about 30 minutes or so the blood 
flow was returned to the right adnexal region and the ovarian tissue appeared 
pinkish in color and viable.  please note that the right fallopian tube appear 
normal as well.  At this moment a right ovarian cystectomy was performed on the 
right follicular cyst by making a small incision on the cyst wall with 
laparoscopic scissors using electrocautery.  The cyst wall was grasped gently, 
and the healthy ovarian tissue was peeled off from the cyst in a gradual 
process using the [place] graspers.  Once the cyst wall was completely 
dissected off from the right ovary, it was  from the base by using the 
5 mm LigaSure device.  Please note, excellent hemostasis was noted.  Once the 
specimen was completely dissected off, it was placed in the pelvis under direct 
visualization.  An Endobag was introduced through the umbilical port and the 
specimen was placed in the Endobag and removed.  Again, both abdominal and 
pelvic anatomy were surveyed and multiple irrigations were performed.  
Excellent hemostasis was noted.  At this moment CO2 gas was allowed to escape 
and all ports were removed under direct visualization.  The skin incisions were 
closed with Dermabond.  All sponge, lap, needle counts were correct.  The 
patient tolerated the procedure well and taken back to recovery room in a 
stable condition.

 

 

Dictated By: MOY STEVE/CRISTINA

DD:    04/03/2017 11:50:28

DT:    04/03/2017 15:01:27

Conf#: [id number]

DID#:  [id number]

 

[email or web address]

## 2017-04-03 NOTE — OPR
Date/Time of Note


Date/Time of Note


DATE: 4/3/17 


TIME: 13:35





Operative Report


Procedure Date:  Apr 1, 2017


Preoperative Diagnosis


Right Ovarian torsion, Right ovarian hemorrhagic cyst 7-8 cm, Right ovarian 

follicular cyst 14-15 cm


Postoperative Diagnosis


same


Operation Performed


Laparoscopic aspiration of right ovarian cysts fluid, de-torsion of the right 

ovary and right ovarian cystectomy


Surgeon:  MOY JETT MD


Assistant:  NADIYA CHÁVEZ M.D.


Anesthesia:  general


Estimated Blood Loss:  10 - 50 ml's (25cc)


Specimens


Peritoneal fluid, ovarian cysts fluid content, right ovarian follicular cyst


Tubes/Drains


none


Complications:  None


Pt Condition Post Procedure:  stable


Disposition:  PACU, other


Operative\Procedure Findings


please note the detailed operative report


Procedure Description


please note the detailed operative report











MOY JETT Apr 3, 2017 13:45

## 2019-04-06 ENCOUNTER — HOSPITAL ENCOUNTER (EMERGENCY)
Dept: HOSPITAL 91 - FTE | Age: 24
Discharge: HOME | End: 2019-04-06
Payer: COMMERCIAL

## 2019-04-06 ENCOUNTER — HOSPITAL ENCOUNTER (EMERGENCY)
Dept: HOSPITAL 10 - FTE | Age: 24
Discharge: HOME | End: 2019-04-06
Payer: COMMERCIAL

## 2019-04-06 VITALS
HEIGHT: 63 IN | WEIGHT: 283.51 LBS | BODY MASS INDEX: 50.23 KG/M2 | WEIGHT: 283.51 LBS | HEIGHT: 63 IN | BODY MASS INDEX: 50.23 KG/M2

## 2019-04-06 VITALS — RESPIRATION RATE: 18 BRPM | DIASTOLIC BLOOD PRESSURE: 75 MMHG | SYSTOLIC BLOOD PRESSURE: 121 MMHG | HEART RATE: 89 BPM
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
## 2019-04-06 DIAGNOSIS — S00.01XA: ICD-10-CM

## 2019-04-06 DIAGNOSIS — S06.0X0A: Primary | ICD-10-CM

## 2019-04-06 PROCEDURE — 81025 URINE PREGNANCY TEST: CPT

## 2019-04-06 PROCEDURE — 70450 CT HEAD/BRAIN W/O DYE: CPT

## 2019-04-06 PROCEDURE — 99284 EMERGENCY DEPT VISIT MOD MDM: CPT

## 2019-04-06 RX ADMIN — LORAZEPAM 1 MG: 0.5 TABLET ORAL at 06:37

## 2019-04-06 RX ADMIN — IBUPROFEN 1 MG: 600 TABLET ORAL at 06:38

## 2019-04-06 RX ADMIN — ACETAMINOPHEN 1 MG: 325 TABLET, FILM COATED ORAL at 06:37
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
## 2019-05-24 ENCOUNTER — HOSPITAL ENCOUNTER (EMERGENCY)
Dept: HOSPITAL 10 - E/R | Age: 24
Discharge: HOME | End: 2019-05-24
Payer: COMMERCIAL

## 2019-05-24 VITALS
BODY MASS INDEX: 51.81 KG/M2 | WEIGHT: 281.53 LBS | BODY MASS INDEX: 51.81 KG/M2 | HEIGHT: 62 IN | HEIGHT: 62 IN | WEIGHT: 281.53 LBS

## 2019-05-24 VITALS — DIASTOLIC BLOOD PRESSURE: 81 MMHG | SYSTOLIC BLOOD PRESSURE: 140 MMHG | HEART RATE: 100 BPM | RESPIRATION RATE: 21 BRPM

## 2019-05-24 DIAGNOSIS — R40.2252: ICD-10-CM

## 2019-05-24 DIAGNOSIS — N39.0: Primary | ICD-10-CM

## 2019-05-24 DIAGNOSIS — H10.021: ICD-10-CM

## 2019-05-24 DIAGNOSIS — R74.0: ICD-10-CM

## 2019-05-24 DIAGNOSIS — R40.2142: ICD-10-CM

## 2019-05-24 DIAGNOSIS — R40.2362: ICD-10-CM

## 2019-05-24 PROCEDURE — 96374 THER/PROPH/DIAG INJ IV PUSH: CPT

## 2019-05-24 PROCEDURE — 71045 X-RAY EXAM CHEST 1 VIEW: CPT

## 2019-05-24 PROCEDURE — 85610 PROTHROMBIN TIME: CPT

## 2019-05-24 PROCEDURE — 84484 ASSAY OF TROPONIN QUANT: CPT

## 2019-05-24 PROCEDURE — 85730 THROMBOPLASTIN TIME PARTIAL: CPT

## 2019-05-24 PROCEDURE — 93005 ELECTROCARDIOGRAM TRACING: CPT

## 2019-05-24 PROCEDURE — 87086 URINE CULTURE/COLONY COUNT: CPT

## 2019-05-24 PROCEDURE — 83605 ASSAY OF LACTIC ACID: CPT

## 2019-05-24 PROCEDURE — 80053 COMPREHEN METABOLIC PANEL: CPT

## 2019-05-24 PROCEDURE — 87040 BLOOD CULTURE FOR BACTERIA: CPT

## 2019-05-24 PROCEDURE — 81001 URINALYSIS AUTO W/SCOPE: CPT

## 2019-05-24 PROCEDURE — 81003 URINALYSIS AUTO W/O SCOPE: CPT

## 2019-05-24 PROCEDURE — 81025 URINE PREGNANCY TEST: CPT

## 2019-05-24 PROCEDURE — 85025 COMPLETE CBC W/AUTO DIFF WBC: CPT

## 2019-05-24 NOTE — ERD
ER Documentation


Chief Complaint


Chief Complaint





C/O FEVER





HPI


Patient is a 23-year-old female, presenting to the ER because of fever for the 


last 3 days, with nasal congestion, headache and sore throat.  She complains of 


right eye redness with discharge for a day, denies blurred vision or pain, 


complains of nausea but no vomiting.  Denies chest pain, dyspnea, dysuria, 


diarrhea.  She does not smoke nor drink





Past medical history: None


Past surgical history: Right ovarian surgery





ROS


All systems reviewed and are negative except as per history of present illness.





Medications


Home Meds


Active Scripts


Polymyxin B Sulfate-TMP* (Polymyxin B-TMP Eye Drops*) 10 Ml Drops, 1 DROP RIGHT 


EYE QID for 7 Days, EA


   Prov:GEORGE HUBBARD MD         19


Ibuprofen* (Motrin*) 600 Mg Tab, 600 MG PO Q6H PRN for PAIN AND OR ELEVATED 


TEMP, #20 TAB


   Prov:GEORGE HUBBARD MD         19


Cephalexin* (Keflex*) 500 Mg Capsule, 500 MG PO Q6, #28 CAP


   Prov:GEORGE HUBBARD MD         19


Reported Medications


Acetaminophen (Aceta) 325 Mg Tablet, 650 MG PO Q6H


   TAKE 2 TABLETS EVERY 6 HOURS AS NEEDED FOR PAIN AND OR ELEVATED TEMP


   19


Discontinued Scripts


Acetaminophen* (Tylenol*) 325 Mg Tablet, 2 TAB PO Q6 PRN for PAIN AND OR 


ELEVATED TEMP, #20 TAB


   Prov:SAGE JUNIOR MD         19


Ibuprofen* (Motrin*) 600 Mg Tab, 600 MG PO Q6H PRN for PAIN AND OR ELEVATED 


TEMP, #20 TAB


   Prov:SAGE JUNIOR MD         19


Docusate Sodium* (Colace*) 100 Mg Capsule, 100 MG PO BID for CONSTIPATION for 30


Days, #60 CAP 2 Refills


   Prov:JUAN CARLOS EPPERSON MD         17


Ibuprofen* (Ibuprofen*) 600 Mg Tablet, 600 MG PO Q6H PRN for PAIN for 10 Days, 


#40 TAB 1 Refill


   Do not take medication on an empty stomach. Take with food or milk.


   Prov:JUAN CARLOS EPPERSON MD         17





Allergies


Allergies:  


Coded Allergies:  


     No Known Drug Allergy (Verified  Allergy, Mild, 9/29/10)





PMhx/Soc


History of Surgery:  No


Anesthesia Reaction:  No


Hx Neurological Disorder:  No


Hx Respiratory Disorders:  No


Hx Cardiac Disorders:  No


Hx Psychiatric Problems:  No


Hx Miscellaneous Medical Probl:  No


Hx Alcohol Use:  Yes (OCCASIONAL)


Hx Substance Use:  No


Hx Tobacco Use:  No





Physical Exam


Vitals





Vital Signs


  Date      Temp   Pulse  Resp  B/P (MAP)   Pulse Ox  O2         O2 Flow    FiO2


Time                                                  Delivery   Rate


   19  100.5    100    21      140/81       100  Room Air


     04:21                           (100)


   19  105.3


     02:02


   19  101.4


     01:21


   19  105.3    152    21      141/92        96


     01:21                           (108)





Physical Exam


 Const:      No acute distress.


 Head:        Atraumatic.


 Eyes:       Right erythematous conjunctiva, minimal discharge, no nystagmus


 ENT:         Normal External Ears, Nose and Mouth.


 Neck:        Full range of motion.  No meningismus.


 Resp:         Clear to auscultation bilaterally.


 Cardio:       Regular tachycardic


 Abd:         Soft,  obese, normal bowel sounds, non tender.


 Skin:         No petechiae or rashes.


 Back:        No midline or flank tenderness.


 Ext:          No cyanosis, or edema.


 Neur:        Awake and alert. No focal deficit


 Psych:        Normal Mood and Affect.


Result Diagram:  


19 0148                                                                    


           19 0148





Results 24 hrs





Laboratory Tests


Test
               19
01:48    19
01:50  19
01:56  19
02:03


White Blood Count   11.2 10^3/ul


Red Blood Count     4.65 10^6/ul


Hemoglobin             13.2 g/dl


Hematocrit                40.9 %


Mean Corpuscular         88.0 fl


Volume


Mean Corpuscular         28.4 pg


Hemoglobin


Mean Corpuscular      32.3 g/dl 
  
                
              



Hemoglobin
Concen


t


Red Cell                  12.2 %


Distribution


Width


Platelet Count       312 10^3/UL


Mean Platelet             9.2 fl


Volume


Immature                 0.600 %


Granulocytes %


Neutrophils %             76.7 %


Lymphocytes %             14.3 %


Monocytes %                7.8 %


Eosinophils %              0.3 %


Basophils %                0.3 %


Nucleated Red        0.0 /100WBC


Blood Cells %


Immature           0.070 10^3/ul


Granulocytes #


Neutrophils #        8.6 10^3/ul


Lymphocytes #        1.6 10^3/ul


Monocytes #          0.9 10^3/ul


Eosinophils #        0.0 10^3/ul


Basophils #          0.0 10^3/ul


Nucleated Red        0.0 10^3/ul


Blood Cells #


Prothrombin Time        12.1 Sec


Prothrombin Time             0.9


Ratio


INR International          0.89 
  
                
              



Normalized
Ratio


Activated              32.9 Sec 
  
                
              



Partial
Thrombopl


ast Time


Sodium Level          141 mmol/L


Potassium Level       3.7 mmol/L


Chloride Level        103 mmol/L


Carbon Dioxide         28 mmol/L


Level


Anion Gap                     10


Blood Urea              16 mg/dl


Nitrogen


Creatinine            0.87 mg/dl


Est Glomerular     > 60 mL/min 
   
                
              



Filtrat


Rate
mL/min


Glucose Level          137 mg/dl


POC Venous            1.2 mmol/L


Lactate


Calcium Level          8.9 mg/dl


Total Bilirubin        0.4 mg/dl


Direct Bilirubin      0.00 mg/dl


Indirect               0.4 mg/dl


Bilirubin


Aspartate Amino        134 IU/L 
  
                
              



Transf
(AST/SGOT)


Alanine                125 IU/L 
  
                
              



Aminotransferase



(ALT/SGPT)


Alkaline                118 IU/L


Phosphatase


Troponin I         < 0.012 ng/ml


Total Protein           8.9 g/dl


Albumin                 4.2 g/dl


Globulin               4.70 g/dl


Albumin/Globulin            0.89


Ratio


Urine Color                        NATHALIE


Urine Clarity                      CLOUDY


Urine pH                                      5.0


Urine Specific                              1.029


Gravity


Urine Ketones                      NEGATIVE mg/dL


Urine Nitrite                      NEGATIVE mg/dL


Urine Bilirubin                    NEGATIVE mg/dL


Urine                                    1+ mg/dL


Urobilinogen


Urine Leukocyte                         2+ Juventino/ul


Esterase


Urine Microscopic                         40 /HPF


RBC


Urine Microscopic                        103 /HPF


WBC


Urine Squamous     
               MANY /HPF 
      
              



Epithelial
Cells


Urine              
               FEW /HPF 
       
              



Transitional


Epithelial
Cells


Urine Bacteria                     FEW /HPF


Urine Mucus                        FEW /HPF


Urine Hemoglobin                         2+ mg/dL


Urine Glucose                      NEGATIVE mg/dL


Urine Total                              1+ mg/dl


Protein


POC Beta HCG,                                       NEGATIVE


Qualitative


Bedside Urine pH                                                            5.5


(LAB)


Bedside Urine                                                                2+


Protein (LAB)


Bedside Urine                                                      Negative


Glucose (UA)


Bedside Urine                                                      Trace


Ketones (LAB)


Bedside Urine                                                                2+


Blood


Bedside Urine                                                      Negative


Nitrite (LAB)


Bedside Urine      
               
                
                       1+ 



Leukocyte
Esteras


e (L





Current Medications


 Medications
   Dose
          Sig/Kailee
       Start Time
   Status  Last


 (Trade)       Ordered        Route
 PRN     Stop Time              Admin
Dose


                              Reason                                Admin


 Sodium         1,500 ml       BOLUS OVER 2   19       DC           19


Chloride
                     HOURS STAT
    01:45
                       02:03



(NS)                          IV*
           19 01:47


                650 mg         ONCE  ONCE
    19       DC           19


Acetaminophen                 PO
            02:00
                       02:02




  (Tylenol                                  19 02:01


Tab)


 Ibuprofen
     600 mg         ONCE  ONCE
    19       DC           19


(Motrin)                      PO
            03:00
                       03:29



                                             19 03:11


 Ceftriaxone    50 ml @ 
      ONCE  ONCE
    19       DC           19


Sodium         100 mls/hr     IVPB
          03:00
                       03:29



                                             19 03:29








Procedures/MDM


                          [place] [address]


                        Radiology Main Line: [phone]





                            DIAGNOSTIC IMAGING REPORT





Patient: ISH ARELLANO   : 1995   Age: 23  Sex: F                    


   


       MR #:    [id number]   Acct #:   [id number]    DOS: 19 0145


Ordering MD: GEORGE HUBBARD MD   Location:  E/R   Room/Bed:                    


                       


                                        


PROCEDURE:   XR Chest. 


 


CLINICAL INDICATION:  Fever with possible sepsis.


 


TECHNIQUE:   Single portable AP frontal radiographic view of the chest obtained 


 


COMPARISON:   CT abdomen 2017. 


 


FINDINGS:


Low lung volumes with patchy bibasilar opacities which may reflect consolidation


or atelectasis.No identifiable effusion or pneumothorax. Cardiomediastinal 


silhouette appears normal. Trachea is midline. Osseous structures are grossly 


intact. No free air below the diaphragms.


 


IMPRESSION:


Low lung volumes with patchy bibasilar opacities which may reflect consolidation


or atelectasis.


 


 


RPTAT: HSAN


_____________________________________________ 


Physician Elias           Date    Time 


Electronically viewed and signed by Physician Elias on 2019 


02:39 


 


D:  2019 02:39  T:  2019 02:39


xN/





CC: GEORGE HUBBARD MD





[id number]





MEDICAL MAKING DECISION: The patient is a 23-year-old female, presenting with 


acute cystitis, acute right bacterial conjunctivitis.  She was treated with IBW 


IV fluid for clinical dehydration, Tylenols and Motrin for fever, Rocephin IV 


for acute cystitis with good response.  She is stable for outpatient follow-up





The differential diagnoses considered include but are not limited to UTI, 


pyelonephritis, pneumonia, influenza





Departure


Diagnosis:  


   Primary Impression:  


   UTI (urinary tract infection)


   Additional Impressions:  


   Bacterial conjunctivitis of right eye


   Transaminitis


Condition:  Good


Comments


She was discharged with Keflex, Motrin, Polytrim eyedrop





The patient's blood pressure was elevated (>120/80) but appears stable without 


evidence of hypertension emergency or urgency.  The patient was counseled about 


the risks of hypertension and urged to pursue outpatient monitoring and therapy 


within a week with their primary care physician.





I discussed the findings with the patient. I advised the patient to follow-up 


with the primary physician in about 2-3 days, sooner if needed and return if any


concern.





Disclaimer: Inadvertent spelling and grammatical errors are likely due to 


EHR/dictation software use and do not reflect on the overall quality of patient 


care. Also, please note that the electronic time recorded on this note does not 


necessarily reflect the actual time of the patient encounter.











GEORGE HUBBARD MD              May 24, 2019 01:43